# Patient Record
Sex: MALE | Race: WHITE | NOT HISPANIC OR LATINO | Employment: FULL TIME | URBAN - METROPOLITAN AREA
[De-identification: names, ages, dates, MRNs, and addresses within clinical notes are randomized per-mention and may not be internally consistent; named-entity substitution may affect disease eponyms.]

---

## 2022-09-30 ENCOUNTER — APPOINTMENT (OUTPATIENT)
Dept: GENERAL RADIOLOGY | Facility: HOSPITAL | Age: 62
End: 2022-09-30

## 2022-09-30 ENCOUNTER — HOSPITAL ENCOUNTER (EMERGENCY)
Facility: HOSPITAL | Age: 62
Discharge: HOME | End: 2022-09-30
Attending: EMERGENCY MEDICINE | Admitting: EMERGENCY MEDICINE

## 2022-09-30 VITALS
RESPIRATION RATE: 16 BRPM | HEIGHT: 71 IN | HEART RATE: 109 BPM | SYSTOLIC BLOOD PRESSURE: 156 MMHG | BODY MASS INDEX: 31.5 KG/M2 | WEIGHT: 225 LBS | OXYGEN SATURATION: 97 % | TEMPERATURE: 97.9 F | DIASTOLIC BLOOD PRESSURE: 95 MMHG

## 2022-09-30 DIAGNOSIS — S90.02XA CONTUSION OF LEFT ANKLE, INITIAL ENCOUNTER: ICD-10-CM

## 2022-09-30 DIAGNOSIS — S90.32XA CONTUSION OF LEFT FOOT, INITIAL ENCOUNTER: Primary | ICD-10-CM

## 2022-09-30 PROCEDURE — 73610 X-RAY EXAM OF ANKLE: CPT

## 2022-09-30 PROCEDURE — 73630 X-RAY EXAM OF FOOT: CPT

## 2022-09-30 PROCEDURE — 99283 EMERGENCY DEPT VISIT LOW MDM: CPT

## 2022-10-01 NOTE — ED PROVIDER NOTES
EMERGENCY DEPARTMENT ENCOUNTER    Room Number:  13/13  Date seen:  9/30/2022  PCP: Provider, No Known  Historian: Patient      HPI:  Chief Complaint: Foot contusion  A complete HPI/ROS/PMH/PSH/SH/FH are unobtainable due to: N/A  Context: Aman Onofre is a 62 y.o. male who presents to the ED c/o accidental injury to the left foot.  Today he was moving a 600 pound gun safe on a leland when suddenly he lost control of it and the safety came down onto the side of his left foot.  He was wearing tennis shoes at the time.  He had immediate pain to the foot.  The foot is still sore and somewhat bruised now but he is able to bear weight.  He denies any other areas of injury or concern.      PAST MEDICAL HISTORY  Active Ambulatory Problems     Diagnosis Date Noted   • No Active Ambulatory Problems     Resolved Ambulatory Problems     Diagnosis Date Noted   • No Resolved Ambulatory Problems     No Additional Past Medical History         PAST SURGICAL HISTORY  No past surgical history on file.      FAMILY HISTORY  No family history on file.      SOCIAL HISTORY  Social History     Socioeconomic History   • Marital status: Single         ALLERGIES  Patient has no known allergies.        REVIEW OF SYSTEMS  Review of Systems   Constitutional: Negative for activity change and fever.   HENT: Negative.    Respiratory: Negative for cough and shortness of breath.    Cardiovascular: Negative for chest pain.   Gastrointestinal: Negative for abdominal pain.   Musculoskeletal: Positive for arthralgias and joint swelling. Negative for gait problem.   Neurological: Negative for syncope and headaches.   All other systems reviewed and are negative.      PHYSICAL EXAM  ED Triage Vitals [09/30/22 2017]   Temp Heart Rate Resp BP SpO2   97.9 °F (36.6 °C) 109 16 156/95 97 %      Temp src Heart Rate Source Patient Position BP Location FiO2 (%)   Oral Monitor Sitting Right arm --         GENERAL: Pleasant gentleman, sitting comfortably in the  chair, no acute distress  HENT: nares patent, normocephalic and atraumatic  EYES: no scleral icterus, EOMI  CV: regular rhythm, normal rate, normal distal pulses  RESPIRATORY: normal effort, no stridor  MUSCULOSKELETAL: no deformity evident.  Mild tenderness notable to the dorsal lateral aspect of the proximal and middle left foot as well as the lateral malleolus.  However it is warm and well-perfused.  Patient able to bear weight unassisted.  NEURO: alert, moves all extremities, follows commands  PSYCH:  calm, cooperative  SKIN: warm, dry    Vital signs and nursing notes reviewed.          LAB RESULTS  No results found for this or any previous visit (from the past 24 hour(s)).    Ordered the above labs and reviewed the results.        RADIOLOGY  XR Ankle 3+ View Left    Result Date: 9/30/2022  CR Foot Comp Min 3 Vws LT, CR Ankle Min 3 Vws LT INDICATION: Left foot and ankle pain after a safe fell on foot today. COMPARISON: None available FINDINGS: AP, lateral, and oblique views of the left foot and left ankle.  No fracture or dislocation.  Joint spaces appear maintained. Bipartite tibial hallux sesamoid. Plantar calcaneal enthesophyte. Ankle mortise appears intact. No focal soft tissue swelling. No foreign body.     No acute traumatic findings. Signer Name: NENO Mccall MD  Signed: 9/30/2022 8:55 PM  Workstation Name: [email or web address]  [facility]    XR Foot 3+ View Left    Result Date: 9/30/2022  CR Foot Comp Min 3 Vws LT, CR Ankle Min 3 Vws LT INDICATION: Left foot and ankle pain after a safe fell on foot today. COMPARISON: None available FINDINGS: AP, lateral, and oblique views of the left foot and left ankle.  No fracture or dislocation.  Joint spaces appear maintained. Bipartite tibial hallux sesamoid. Plantar calcaneal enthesophyte. Ankle mortise appears intact. No focal soft tissue swelling. No foreign body.     No acute traumatic findings. Signer Name: NENO Mccall MD  Signed:  "9/30/2022 8:55 PM  Workstation Name: [email or web address]  [facility]      Ordered the above noted radiological studies. Reviewed by me in PACS.            PROCEDURES  Procedures        MEDICATIONS GIVEN IN ER  Medications - No data to display                MEDICAL DECISION MAKING, PROGRESS, and CONSULTS    All labs have been independently reviewed by me.  All radiology studies have been reviewed by me and discussed with radiologist dictating the report.   EKG's independently viewed and interpreted by me.  Discussion below represents my analysis of pertinent findings related to patient's condition, differential diagnosis, treatment plan and final disposition.          ED Course as of 09/30/22 2154   Fri Sep 30, 2022   2153 I reviewed the x-rays which are reassuring for no acute osseous injury.  Advised rice therapy.  Discharged home in good condition with usual \"return to ER\" instructions. [KAYDEN]      ED Course User Index  [KAYDEN] Erik Rainey MD                DIAGNOSIS  Final diagnoses:   Contusion of left foot, initial encounter   Contusion of left ankle, initial encounter         DISPOSITION  Home            Latest Documented Vital Signs:  As of 21:54 EDT  BP- 156/95 HR- 109 Temp- 97.9 °F (36.6 °C) (Oral) O2 sat- 97%        --    Please note that portions of this were completed with a voice recognition program.          Erik Rainey MD  09/30/22 2154    "

## 2022-10-01 NOTE — DISCHARGE INSTRUCTIONS
Rest, ice, elevate the injured foot and ankle as need be.  May take Tylenol or ibuprofen every 8 hours as needed for pain.  Please return to the emergency room for any worsening symptoms or concerns.